# Patient Record
(demographics unavailable — no encounter records)

---

## 2025-03-06 NOTE — PHYSICAL EXAM

## 2025-03-06 NOTE — HISTORY OF PRESENT ILLNESS
[FreeTextEntry1] : AMANDA PRINGLE is a 42 year old male presenting to establish care and for CPE.  [de-identified] : taking metoprolol 100mg daily - since urgent care visit about 2 weeks prior  1 drink daily - beer  high stress -some work related, changes school for son (possibly autistic), moving homes.  has a psychiatrist  follows with endocrinologist

## 2025-03-06 NOTE — HEALTH RISK ASSESSMENT
[2 - 3 times a week (3 pts)] : 2 - 3  times a week (3 points) [1 or 2 (0 pts)] : 1 or 2 (0 points) [Never (0 pts)] : Never (0 points) [Yes] : In the past 12 months have you used drugs other than those required for medical reasons? Yes [No falls in past year] : Patient reported no falls in the past year [1] : 2) Feeling down, depressed, or hopeless for several days (1) [PHQ-2 Positive] : PHQ-2 Positive [HIV test declined] : HIV test declined [Hepatitis C test declined] : Hepatitis C test declined [With Family] : lives with family [# of Members in Household ___] :  household currently consist of [unfilled] member(s) [Unemployed] : unemployed [College] : College [] :  [# Of Children ___] : has [unfilled] children [Sexually Active] : sexually active [Feels Safe at Home] : Feels safe at home [Fully functional (bathing, dressing, toileting, transferring, walking, feeding)] : Fully functional (bathing, dressing, toileting, transferring, walking, feeding) [Fully functional (using the telephone, shopping, preparing meals, housekeeping, doing laundry, using] : Fully functional and needs no help or supervision to perform IADLs (using the telephone, shopping, preparing meals, housekeeping, doing laundry, using transportation, managing medications and managing finances) [Reports normal functional visual acuity (ie: able to read med bottle)] : Reports normal functional visual acuity [Smoke Detector] : smoke detector [Carbon Monoxide Detector] : carbon monoxide detector [Seat Belt] :  uses seat belt [Sunscreen] : uses sunscreen [Never] : Never [NO] : No [Audit-CScore] : 3 [de-identified] : marijuana  [[id number]] : 2 [Change in mental status noted] : No change in mental status noted [Language] : denies difficulty with language [Behavior] : denies difficulty with behavior [Learning/Retaining New Information] : denies difficulty learning/retaining new information [Handling Complex Tasks] : denies difficulty handling complex tasks [Reasoning] : denies difficulty with reasoning [Spatial Ability and Orientation] : denies difficulty with spatial ability and orientation [Reports changes in hearing] : Reports no changes in hearing [Reports changes in vision] : Reports no changes in vision [Reports changes in dental health] : Reports no changes in dental health [Travel to Developing Areas] : does not  travel to developing areas [TB Exposure] : is not being exposed to tuberculosis [Caregiver Concerns] : does not have caregiver concerns

## 2025-07-14 NOTE — HISTORY OF PRESENT ILLNESS
[FreeTextEntry1] : 41 yo male with a hx hypertension, hypothyroidism, anxiety here to establish care.   10 years ago- diagnosed with hypertension. He has had episodes where BP as high as 170/117 with sx headache, nausea.  He was seen in urgent care for this after trying to come down on metoprolol.   PCP: Dr. Ramos  PSHx: none  Tobacco use: quit smoking cigarettes age 18   Alcohol use: weekends  Drug use: weekly marijuana  Caffeine: 3-4 cups coffee daily  Family hx: maternal uncle with MI age 60s, and another maternal uncle age 70s. mother with HTN  Exercise: walking few times weekly  Social: works from home

## 2025-07-14 NOTE — ASSESSMENT
[FreeTextEntry1] : Hypertension  discussed metoprolol not first line antihypertensive (no other indication for BB) and he still remains with elevated BP  will reduce metoprolol to 50mg daily and start losartan 25mg with goal to transition off beta blocker   Dyslipidemia   discussed diet changes      The patient was counseled for 15 minutes regarding the need for aggressive risk factor modification including an effort to achieve an ideal body weight, to limit salt intake, to eat a heart healthy diet, to limit alcohol intake and to increase their physical activity to a level which is appropriate for their age and their conditions.

## 2025-07-28 NOTE — HISTORY OF PRESENT ILLNESS
[FreeTextEntry1] : 41 yo male with a hx hypertension, hypothyroidism, anxiety here for routine follow up.   10 years ago- diagnosed with hypertension. He has had episodes where BP as high as 170/117 with sx headache, nausea.  He was seen in urgent care for this after trying to come down on metoprolol.   Doing well since last visit- BP improved  PCP: Dr. Ramos  PSHx: none  Tobacco use: quit smoking cigarettes age 18   Alcohol use: weekends  Drug use: weekly marijuana  Caffeine: 3-4 cups coffee daily  Family hx: maternal uncle with MI age 60s, and another maternal uncle age 70s. mother with HTN  Exercise: walking few times weekly  Social: works from home

## 2025-07-28 NOTE — ASSESSMENT
[FreeTextEntry1] : Hypertension  discussed metoprolol not first line antihypertensive (no other indication for BB)  will again reduce metoprolol to 25mg daily and increase losartan to 50mg with goal to transition off beta blocker   Dyslipidemia   discussed diet changes     will f/u 2 months